# Patient Record
Sex: MALE | Race: OTHER | Employment: FULL TIME | URBAN - METROPOLITAN AREA
[De-identification: names, ages, dates, MRNs, and addresses within clinical notes are randomized per-mention and may not be internally consistent; named-entity substitution may affect disease eponyms.]

---

## 2018-07-03 ENCOUNTER — HOSPITAL ENCOUNTER (OUTPATIENT)
Dept: GENERAL RADIOLOGY | Facility: HOSPITAL | Age: 48
Discharge: HOME | End: 2018-07-03
Attending: INTERNAL MEDICINE
Payer: COMMERCIAL

## 2018-07-03 DIAGNOSIS — M25.472 LEFT ANKLE SWELLING: ICD-10-CM

## 2018-07-03 DIAGNOSIS — M25.572 LEFT ANKLE PAIN, UNSPECIFIED CHRONICITY: ICD-10-CM

## 2018-07-03 PROCEDURE — 73610 X-RAY EXAM OF ANKLE: CPT | Performed by: INTERNAL MEDICINE

## 2018-12-07 ENCOUNTER — HOSPITAL ENCOUNTER (OUTPATIENT)
Dept: GENERAL RADIOLOGY | Facility: HOSPITAL | Age: 48
Discharge: HOME | End: 2018-12-07
Attending: INTERNAL MEDICINE
Payer: COMMERCIAL

## 2018-12-07 DIAGNOSIS — G89.29 CHRONIC MIDLINE LOW BACK PAIN WITHOUT SCIATICA: ICD-10-CM

## 2018-12-07 DIAGNOSIS — M54.50 CHRONIC MIDLINE LOW BACK PAIN WITHOUT SCIATICA: ICD-10-CM

## 2018-12-07 PROCEDURE — 72110 X-RAY EXAM L-2 SPINE 4/>VWS: CPT | Performed by: INTERNAL MEDICINE

## 2019-05-16 PROBLEM — B19.10 HEPATITIS B: Status: ACTIVE | Noted: 2019-05-16

## 2019-11-06 ENCOUNTER — TELEPHONE (OUTPATIENT)
Dept: ORTHOPEDICS | Age: 49
End: 2019-11-06

## 2019-11-07 ENCOUNTER — IMAGING SERVICES (OUTPATIENT)
Dept: GENERAL RADIOLOGY | Age: 49
End: 2019-11-07

## 2019-11-11 ENCOUNTER — APPOINTMENT (OUTPATIENT)
Dept: SPORTS MEDICINE | Age: 49
End: 2019-11-11

## 2019-11-11 ENCOUNTER — WORKER'S COMP (OUTPATIENT)
Dept: SPORTS MEDICINE | Age: 49
End: 2019-11-11

## 2019-11-11 DIAGNOSIS — S62.604A CLOSED NONDISPLACED FRACTURE OF PHALANX OF RIGHT RING FINGER, UNSPECIFIED PHALANX, INITIAL ENCOUNTER: Primary | ICD-10-CM

## 2019-11-11 PROCEDURE — 99203 OFFICE O/P NEW LOW 30 MIN: CPT | Performed by: INTERNAL MEDICINE

## 2019-11-11 RX ORDER — LOVASTATIN 40 MG/1
40 TABLET ORAL
COMMUNITY
Start: 2019-05-16

## 2019-11-11 RX ORDER — AMMONIUM LACTATE 12 G/100G
CREAM TOPICAL
COMMUNITY
Start: 2018-05-11

## 2019-11-11 SDOH — HEALTH STABILITY: MENTAL HEALTH: HOW OFTEN DO YOU HAVE 6 OR MORE DRINKS ON ONE OCCASION?: LESS THAN MONTHLY

## 2019-11-11 SDOH — HEALTH STABILITY: MENTAL HEALTH: HOW OFTEN DO YOU HAVE A DRINK CONTAINING ALCOHOL?: MONTHLY OR LESS

## 2019-11-11 ASSESSMENT — PAIN SCALES - GENERAL: PAINLEVEL: 0

## 2019-11-18 ENCOUNTER — IMAGING SERVICES (OUTPATIENT)
Dept: GENERAL RADIOLOGY | Age: 49
End: 2019-11-18
Attending: INTERNAL MEDICINE

## 2019-11-18 ENCOUNTER — WORKER'S COMP (OUTPATIENT)
Dept: SPORTS MEDICINE | Age: 49
End: 2019-11-18

## 2019-11-18 VITALS
DIASTOLIC BLOOD PRESSURE: 78 MMHG | HEART RATE: 72 BPM | HEIGHT: 69 IN | BODY MASS INDEX: 23.11 KG/M2 | WEIGHT: 156 LBS | SYSTOLIC BLOOD PRESSURE: 119 MMHG

## 2019-11-18 DIAGNOSIS — S62.604D CLOSED NONDISPLACED FRACTURE OF PHALANX OF RIGHT RING FINGER WITH ROUTINE HEALING, UNSPECIFIED PHALANX, SUBSEQUENT ENCOUNTER: Primary | ICD-10-CM

## 2019-11-18 DIAGNOSIS — S62.604A CLOSED NONDISPLACED FRACTURE OF PHALANX OF RIGHT RING FINGER, UNSPECIFIED PHALANX, INITIAL ENCOUNTER: ICD-10-CM

## 2019-11-18 DIAGNOSIS — M20.011 MALLET FINGER OF RIGHT HAND: ICD-10-CM

## 2019-11-18 PROCEDURE — 73140 X-RAY EXAM OF FINGER(S): CPT | Performed by: RADIOLOGY

## 2019-11-18 PROCEDURE — 99213 OFFICE O/P EST LOW 20 MIN: CPT | Performed by: INTERNAL MEDICINE

## 2019-11-18 SDOH — HEALTH STABILITY: MENTAL HEALTH: HOW OFTEN DO YOU HAVE 6 OR MORE DRINKS ON ONE OCCASION?: LESS THAN MONTHLY

## 2019-11-18 SDOH — HEALTH STABILITY: MENTAL HEALTH: HOW OFTEN DO YOU HAVE A DRINK CONTAINING ALCOHOL?: MONTHLY OR LESS

## 2019-12-16 ENCOUNTER — TELEPHONE (OUTPATIENT)
Dept: SPORTS MEDICINE | Age: 49
End: 2019-12-16

## 2019-12-16 ENCOUNTER — WORKER'S COMP (OUTPATIENT)
Dept: SPORTS MEDICINE | Age: 49
End: 2019-12-16

## 2019-12-16 ENCOUNTER — IMAGING SERVICES (OUTPATIENT)
Dept: GENERAL RADIOLOGY | Age: 49
End: 2019-12-16
Attending: INTERNAL MEDICINE

## 2019-12-16 VITALS
HEART RATE: 72 BPM | DIASTOLIC BLOOD PRESSURE: 75 MMHG | WEIGHT: 156 LBS | BODY MASS INDEX: 23.11 KG/M2 | HEIGHT: 69 IN | SYSTOLIC BLOOD PRESSURE: 119 MMHG

## 2019-12-16 DIAGNOSIS — S62.604D CLOSED NONDISPLACED FRACTURE OF PHALANX OF RIGHT RING FINGER WITH ROUTINE HEALING, UNSPECIFIED PHALANX, SUBSEQUENT ENCOUNTER: Primary | ICD-10-CM

## 2019-12-16 DIAGNOSIS — S62.604D CLOSED NONDISPLACED FRACTURE OF PHALANX OF RIGHT RING FINGER WITH ROUTINE HEALING, UNSPECIFIED PHALANX, SUBSEQUENT ENCOUNTER: ICD-10-CM

## 2019-12-16 PROCEDURE — 73140 X-RAY EXAM OF FINGER(S): CPT | Performed by: RADIOLOGY

## 2019-12-16 PROCEDURE — 99213 OFFICE O/P EST LOW 20 MIN: CPT | Performed by: INTERNAL MEDICINE

## 2019-12-16 SDOH — HEALTH STABILITY: MENTAL HEALTH: HOW OFTEN DO YOU HAVE 6 OR MORE DRINKS ON ONE OCCASION?: LESS THAN MONTHLY

## 2019-12-16 SDOH — HEALTH STABILITY: MENTAL HEALTH: HOW OFTEN DO YOU HAVE A DRINK CONTAINING ALCOHOL?: MONTHLY OR LESS

## 2020-01-07 ENCOUNTER — IMAGING SERVICES (OUTPATIENT)
Dept: GENERAL RADIOLOGY | Age: 50
End: 2020-01-07
Attending: ORTHOPAEDIC SURGERY

## 2020-01-07 ENCOUNTER — WORKER'S COMP (OUTPATIENT)
Dept: ORTHOPEDICS | Age: 50
End: 2020-01-07

## 2020-01-07 VITALS — BODY MASS INDEX: 23.7 KG/M2 | HEIGHT: 69 IN | WEIGHT: 160 LBS

## 2020-01-07 DIAGNOSIS — S62.604D CLOSED NONDISPLACED FRACTURE OF PHALANX OF RIGHT RING FINGER WITH ROUTINE HEALING, UNSPECIFIED PHALANX, SUBSEQUENT ENCOUNTER: ICD-10-CM

## 2020-01-07 DIAGNOSIS — S62.604D CLOSED NONDISPLACED FRACTURE OF PHALANX OF RIGHT RING FINGER WITH ROUTINE HEALING, UNSPECIFIED PHALANX, SUBSEQUENT ENCOUNTER: Primary | ICD-10-CM

## 2020-01-07 PROCEDURE — 99244 OFF/OP CNSLTJ NEW/EST MOD 40: CPT | Performed by: ORTHOPAEDIC SURGERY

## 2020-01-07 PROCEDURE — 73140 X-RAY EXAM OF FINGER(S): CPT | Performed by: RADIOLOGY

## 2020-02-10 ENCOUNTER — WORKER'S COMP (OUTPATIENT)
Dept: SPORTS MEDICINE | Age: 50
End: 2020-02-10

## 2020-02-10 VITALS
BODY MASS INDEX: 23.7 KG/M2 | DIASTOLIC BLOOD PRESSURE: 74 MMHG | WEIGHT: 160 LBS | SYSTOLIC BLOOD PRESSURE: 110 MMHG | HEART RATE: 68 BPM | HEIGHT: 69 IN

## 2020-02-10 DIAGNOSIS — M20.011 MALLET FINGER OF RIGHT HAND: ICD-10-CM

## 2020-02-10 DIAGNOSIS — S62.604D CLOSED NONDISPLACED FRACTURE OF PHALANX OF RIGHT RING FINGER WITH ROUTINE HEALING, UNSPECIFIED PHALANX, SUBSEQUENT ENCOUNTER: Primary | ICD-10-CM

## 2020-02-10 PROCEDURE — 99213 OFFICE O/P EST LOW 20 MIN: CPT | Performed by: INTERNAL MEDICINE

## 2020-02-10 SDOH — HEALTH STABILITY: MENTAL HEALTH: HOW OFTEN DO YOU HAVE 6 OR MORE DRINKS ON ONE OCCASION?: LESS THAN MONTHLY

## 2020-02-10 SDOH — HEALTH STABILITY: MENTAL HEALTH: HOW OFTEN DO YOU HAVE A DRINK CONTAINING ALCOHOL?: MONTHLY OR LESS

## 2020-02-11 ENCOUNTER — TELEPHONE (OUTPATIENT)
Dept: ORTHOPEDICS | Age: 50
End: 2020-02-11

## 2021-05-26 VITALS
HEIGHT: 69 IN | HEART RATE: 80 BPM | RESPIRATION RATE: 20 BRPM | SYSTOLIC BLOOD PRESSURE: 128 MMHG | WEIGHT: 156.6 LBS | DIASTOLIC BLOOD PRESSURE: 74 MMHG | BODY MASS INDEX: 23.19 KG/M2

## 2021-07-23 ENCOUNTER — HOSPITAL ENCOUNTER (OUTPATIENT)
Dept: GENERAL RADIOLOGY | Facility: HOSPITAL | Age: 51
Discharge: HOME | End: 2021-07-23
Attending: INTERNAL MEDICINE
Payer: COMMERCIAL

## 2021-07-23 DIAGNOSIS — S89.92XA INJURY OF LEFT LOWER EXTREMITY, INITIAL ENCOUNTER: ICD-10-CM

## 2021-07-23 DIAGNOSIS — M25.472 LEFT ANKLE SWELLING: ICD-10-CM

## 2021-07-23 PROCEDURE — 73610 X-RAY EXAM OF ANKLE: CPT | Performed by: INTERNAL MEDICINE

## 2021-07-23 PROCEDURE — 73590 X-RAY EXAM OF LOWER LEG: CPT | Performed by: INTERNAL MEDICINE

## 2023-08-15 PROBLEM — M54.50 ACUTE MIDLINE LOW BACK PAIN WITHOUT SCIATICA: Status: ACTIVE | Noted: 2023-08-15

## 2025-05-10 ENCOUNTER — HOSPITAL ENCOUNTER (OUTPATIENT)
Dept: GENERAL RADIOLOGY | Facility: HOSPITAL | Age: 55
Discharge: HOME | End: 2025-05-10
Attending: INTERNAL MEDICINE
Payer: COMMERCIAL

## 2025-05-10 DIAGNOSIS — R22.0 MASS OF SCALP: ICD-10-CM

## 2025-05-10 PROCEDURE — 70260 X-RAY EXAM OF SKULL: CPT | Performed by: INTERNAL MEDICINE

## 2025-05-27 ENCOUNTER — OFFICE VISIT (OUTPATIENT)
Facility: LOCATION | Age: 55
End: 2025-05-27
Payer: COMMERCIAL

## 2025-05-27 VITALS
TEMPERATURE: 99 F | OXYGEN SATURATION: 96 % | HEART RATE: 78 BPM | SYSTOLIC BLOOD PRESSURE: 124 MMHG | DIASTOLIC BLOOD PRESSURE: 68 MMHG

## 2025-05-27 DIAGNOSIS — L72.9 SCALP CYST: Primary | ICD-10-CM

## 2025-05-27 PROCEDURE — 99202 OFFICE O/P NEW SF 15 MIN: CPT | Performed by: STUDENT IN AN ORGANIZED HEALTH CARE EDUCATION/TRAINING PROGRAM

## 2025-05-27 PROCEDURE — 3074F SYST BP LT 130 MM HG: CPT | Performed by: STUDENT IN AN ORGANIZED HEALTH CARE EDUCATION/TRAINING PROGRAM

## 2025-05-27 PROCEDURE — 3078F DIAST BP <80 MM HG: CPT | Performed by: STUDENT IN AN ORGANIZED HEALTH CARE EDUCATION/TRAINING PROGRAM

## 2025-05-27 NOTE — H&P
New Patient Visit Note       Active Problems      1. Scalp cyst        Chief Complaint   Chief Complaint   Patient presents with    New Patient     NP-mass of scalp- patient states he has 2 lumps. Never drained.        History of Present Illness   History of Present Illness  Kisha Beltran is a 54 year old male who presents with a long-standing scalp mass.    He has had a scalp mass for over twenty years, which has slowly increased in size. Additionally, he has another similar mass on the back of his head that has also grown over time. The masses do not drain and only cause pain when he accidentally hits his head against something.    He is concerned about the visibility of the mass, particularly when the wind blows his hair, making it more noticeable. This has been a source of discomfort and pain.             Allergies  Kisha is allergic to aspirin buffered.    Past Medical / Surgical / Social / Family History    The past medical and past surgical history have been reviewed by me today.    Past Medical History[1]  Past Surgical History[2]    The family history and social history have been reviewed by me today.    Family History[3]  Social Hx on file[4]   Medications - Current[5]      Review of Systems  The Review of Systems has been reviewed by me during today.  Review of Systems    Physical Findings   /68 (BP Location: Left arm, Patient Position: Sitting, Cuff Size: adult)   Pulse 78   Temp 99 °F (37.2 °C) (Temporal)   SpO2 96%   Physical Exam  Constitutional:       Appearance: Normal appearance.   HENT:      Head: Normocephalic and atraumatic.   Cardiovascular:      Pulses: Normal pulses.   Pulmonary:      Effort: Pulmonary effort is normal.   Skin:     General: Skin is warm.      Capillary Refill: Capillary refill takes less than 2 seconds.             Comments: There is a 3 cm well circumscribed cyst in the anterior rightfrontal scalp. This is mobilenon adherent to deep or superficial structures    and  fluctuant, no signs of infection or erythema, there is a similar lesion measuring 2 cms in the left posterior parietal scalp, also well circumscribed and non adherent to deep or superficial structures    Neurological:      Mental Status: He is alert and oriented to person, place, and time. Mental status is at baseline.             Assessment/Plan  1. Scalp cyst        Kisha Beltran is a 54 year old male referred by Leland Soto MD for evaluation of scalp cysts. These cause pain and discomfort. I recommend proceeding with excision under anesthesia. The risks of lesion excision were discussed with the patient and include but are not limited to bleeding, hematoma, infection, incision separation, incomplete excision, recurrence, injury to surrounding structures, need for additional surgery. The patient voiced understanding and agreed to proceed.  .     Paty Beltre MD       [1]   Past Medical History:   HYPERLIPIDEMIA    Hyperlipidemia   [2] History reviewed. No pertinent surgical history.  [3] History reviewed. No pertinent family history.  [4]   Social History  Socioeconomic History    Marital status:    Tobacco Use    Smoking status: Every Day     Current packs/day: 0.25     Average packs/day: 0.3 packs/day for 25.0 years (6.3 ttl pk-yrs)     Types: Cigarettes    Smokeless tobacco: Never   Vaping Use    Vaping status: Never Used   Substance and Sexual Activity    Alcohol use: No    Drug use: No    Sexual activity: Never   [5]   Current Outpatient Medications:     ergocalciferol 1.25 MG (44018 UT) Oral Cap, Take 1 capsule (50,000 Units total) by mouth once a week., Disp: 12 capsule, Rfl: 3    Lovastatin 40 MG Oral Tab, Take 1 tablet (40 mg total) by mouth nightly., Disp: 90 tablet, Rfl: 2

## 2025-05-27 NOTE — PROGRESS NOTES
The following individual(s) verbally consented to be recorded using ambient AI listening technology and understand that they can each withdraw their consent to this listening technology at any point by asking the clinician to turn off or pause the recording:    Patient name: Kisha Beltran  Additional names:  Amber, spouse

## 2025-06-04 ENCOUNTER — TELEPHONE (OUTPATIENT)
Facility: LOCATION | Age: 55
End: 2025-06-04

## 2025-06-04 NOTE — TELEPHONE ENCOUNTER
Spoke with patients wife- mother did not answer. Informed that the patient may be able to return to work after just a day or 2. Overall it will be up to the doctor to determine after the procedure is done. Patients wife verbalized understanding, is agreeable, and had no further questions or concerns.

## 2025-06-04 NOTE — TELEPHONE ENCOUNTER
Patient mother called and would like to know how long will the patient need to be out of work his upcoming surgery on 6/27/25 with DR. Carter      [phone] call mother

## 2025-06-12 ENCOUNTER — TELEPHONE (OUTPATIENT)
Facility: LOCATION | Age: 55
End: 2025-06-12

## 2025-06-12 DIAGNOSIS — L72.9 SCALP CYST: Primary | ICD-10-CM

## 2025-06-12 NOTE — TELEPHONE ENCOUNTER
No prior authorization required for cpt code 85951 through [facility]. Spoke with Allison isabel @ 11:54am on 6/12   Patient's wound dressing completed per provider order. Wound cleansed with antiseptic spray. Bacitracin applied topically, covered with telfa island adhesive dressing.

## 2025-06-27 ENCOUNTER — HOSPITAL ENCOUNTER (OUTPATIENT)
Facility: HOSPITAL | Age: 55
Setting detail: HOSPITAL OUTPATIENT SURGERY
Discharge: HOME | End: 2025-06-27
Attending: STUDENT IN AN ORGANIZED HEALTH CARE EDUCATION/TRAINING PROGRAM | Admitting: STUDENT IN AN ORGANIZED HEALTH CARE EDUCATION/TRAINING PROGRAM
Payer: COMMERCIAL

## 2025-06-27 ENCOUNTER — ANESTHESIA (OUTPATIENT)
Dept: SURGERY | Facility: HOSPITAL | Age: 55
End: 2025-06-27
Payer: COMMERCIAL

## 2025-06-27 ENCOUNTER — ANESTHESIA EVENT (OUTPATIENT)
Dept: SURGERY | Facility: HOSPITAL | Age: 55
End: 2025-06-27
Payer: COMMERCIAL

## 2025-06-27 VITALS
HEIGHT: 69 IN | DIASTOLIC BLOOD PRESSURE: 70 MMHG | SYSTOLIC BLOOD PRESSURE: 121 MMHG | HEART RATE: 61 BPM | WEIGHT: 162 LBS | RESPIRATION RATE: 18 BRPM | OXYGEN SATURATION: 100 % | BODY MASS INDEX: 23.99 KG/M2 | TEMPERATURE: 99 F

## 2025-06-27 DIAGNOSIS — L72.9 SCALP CYST: ICD-10-CM

## 2025-06-27 DIAGNOSIS — G89.18 POST-OPERATIVE PAIN: Primary | ICD-10-CM

## 2025-06-27 PROCEDURE — 11422 EXC H-F-NK-SP B9+MARG 1.1-2: CPT | Performed by: STUDENT IN AN ORGANIZED HEALTH CARE EDUCATION/TRAINING PROGRAM

## 2025-06-27 PROCEDURE — 11423 EXC H-F-NK-SP B9+MARG 2.1-3: CPT | Performed by: STUDENT IN AN ORGANIZED HEALTH CARE EDUCATION/TRAINING PROGRAM

## 2025-06-27 PROCEDURE — 12032 INTMD RPR S/A/T/EXT 2.6-7.5: CPT | Performed by: STUDENT IN AN ORGANIZED HEALTH CARE EDUCATION/TRAINING PROGRAM

## 2025-06-27 RX ORDER — ONDANSETRON 2 MG/ML
INJECTION INTRAMUSCULAR; INTRAVENOUS AS NEEDED
Status: DISCONTINUED | OUTPATIENT
Start: 2025-06-27 | End: 2025-06-27 | Stop reason: SURG

## 2025-06-27 RX ORDER — HYDROCODONE BITARTRATE AND ACETAMINOPHEN 5; 325 MG/1; MG/1
2 TABLET ORAL ONCE AS NEEDED
Status: COMPLETED | OUTPATIENT
Start: 2025-06-27 | End: 2025-06-27

## 2025-06-27 RX ORDER — ONDANSETRON 2 MG/ML
4 INJECTION INTRAMUSCULAR; INTRAVENOUS EVERY 6 HOURS PRN
Status: DISCONTINUED | OUTPATIENT
Start: 2025-06-27 | End: 2025-06-27

## 2025-06-27 RX ORDER — HYDROMORPHONE HYDROCHLORIDE 1 MG/ML
0.2 INJECTION, SOLUTION INTRAMUSCULAR; INTRAVENOUS; SUBCUTANEOUS EVERY 5 MIN PRN
Refills: 0 | Status: DISCONTINUED | OUTPATIENT
Start: 2025-06-27 | End: 2025-06-27

## 2025-06-27 RX ORDER — ACETAMINOPHEN 500 MG
1000 TABLET ORAL ONCE AS NEEDED
Status: COMPLETED | OUTPATIENT
Start: 2025-06-27 | End: 2025-06-27

## 2025-06-27 RX ORDER — HYDROMORPHONE HYDROCHLORIDE 1 MG/ML
0.2 INJECTION, SOLUTION INTRAMUSCULAR; INTRAVENOUS; SUBCUTANEOUS EVERY 5 MIN PRN
Status: DISCONTINUED | OUTPATIENT
Start: 2025-06-27 | End: 2025-06-27

## 2025-06-27 RX ORDER — HYDROMORPHONE HYDROCHLORIDE 1 MG/ML
0.4 INJECTION, SOLUTION INTRAMUSCULAR; INTRAVENOUS; SUBCUTANEOUS EVERY 5 MIN PRN
Refills: 0 | Status: DISCONTINUED | OUTPATIENT
Start: 2025-06-27 | End: 2025-06-27

## 2025-06-27 RX ORDER — HYDROCODONE BITARTRATE AND ACETAMINOPHEN 5; 325 MG/1; MG/1
2 TABLET ORAL ONCE AS NEEDED
Refills: 0 | Status: DISCONTINUED | OUTPATIENT
Start: 2025-06-27 | End: 2025-06-27

## 2025-06-27 RX ORDER — HYDROCODONE BITARTRATE AND ACETAMINOPHEN 5; 325 MG/1; MG/1
1 TABLET ORAL ONCE AS NEEDED
Refills: 0 | Status: DISCONTINUED | OUTPATIENT
Start: 2025-06-27 | End: 2025-06-27

## 2025-06-27 RX ORDER — HYDROCODONE BITARTRATE AND ACETAMINOPHEN 5; 325 MG/1; MG/1
1 TABLET ORAL ONCE AS NEEDED
Status: COMPLETED | OUTPATIENT
Start: 2025-06-27 | End: 2025-06-27

## 2025-06-27 RX ORDER — HYDROMORPHONE HYDROCHLORIDE 1 MG/ML
0.6 INJECTION, SOLUTION INTRAMUSCULAR; INTRAVENOUS; SUBCUTANEOUS EVERY 5 MIN PRN
Status: DISCONTINUED | OUTPATIENT
Start: 2025-06-27 | End: 2025-06-27

## 2025-06-27 RX ORDER — ACETAMINOPHEN 500 MG
1000 TABLET ORAL ONCE
Status: DISCONTINUED | OUTPATIENT
Start: 2025-06-27 | End: 2025-06-27 | Stop reason: HOSPADM

## 2025-06-27 RX ORDER — OXYCODONE HYDROCHLORIDE 5 MG/1
5 TABLET ORAL EVERY 6 HOURS PRN
Qty: 10 TABLET | Refills: 0 | Status: SHIPPED | OUTPATIENT
Start: 2025-06-27 | End: 2025-07-09

## 2025-06-27 RX ORDER — SCOPOLAMINE 1 MG/3D
1 PATCH, EXTENDED RELEASE TRANSDERMAL ONCE
Status: DISCONTINUED | OUTPATIENT
Start: 2025-06-27 | End: 2025-06-27 | Stop reason: HOSPADM

## 2025-06-27 RX ORDER — LIDOCAINE HYDROCHLORIDE AND EPINEPHRINE 10; 10 MG/ML; UG/ML
INJECTION, SOLUTION INFILTRATION; PERINEURAL AS NEEDED
Status: DISCONTINUED | OUTPATIENT
Start: 2025-06-27 | End: 2025-06-27 | Stop reason: HOSPADM

## 2025-06-27 RX ORDER — SODIUM CHLORIDE, SODIUM LACTATE, POTASSIUM CHLORIDE, CALCIUM CHLORIDE 600; 310; 30; 20 MG/100ML; MG/100ML; MG/100ML; MG/100ML
INJECTION, SOLUTION INTRAVENOUS CONTINUOUS
Status: DISCONTINUED | OUTPATIENT
Start: 2025-06-27 | End: 2025-06-27

## 2025-06-27 RX ORDER — MIDAZOLAM HYDROCHLORIDE 1 MG/ML
1 INJECTION INTRAMUSCULAR; INTRAVENOUS EVERY 5 MIN PRN
Status: DISCONTINUED | OUTPATIENT
Start: 2025-06-27 | End: 2025-06-27

## 2025-06-27 RX ORDER — DEXAMETHASONE SODIUM PHOSPHATE 4 MG/ML
VIAL (ML) INJECTION AS NEEDED
Status: DISCONTINUED | OUTPATIENT
Start: 2025-06-27 | End: 2025-06-27 | Stop reason: SURG

## 2025-06-27 RX ORDER — NALOXONE HYDROCHLORIDE 0.4 MG/ML
80 INJECTION, SOLUTION INTRAMUSCULAR; INTRAVENOUS; SUBCUTANEOUS AS NEEDED
Status: DISCONTINUED | OUTPATIENT
Start: 2025-06-27 | End: 2025-06-27

## 2025-06-27 RX ORDER — HYDROMORPHONE HYDROCHLORIDE 1 MG/ML
0.6 INJECTION, SOLUTION INTRAMUSCULAR; INTRAVENOUS; SUBCUTANEOUS EVERY 5 MIN PRN
Refills: 0 | Status: DISCONTINUED | OUTPATIENT
Start: 2025-06-27 | End: 2025-06-27

## 2025-06-27 RX ORDER — ACETAMINOPHEN 500 MG
1000 TABLET ORAL ONCE AS NEEDED
Status: DISCONTINUED | OUTPATIENT
Start: 2025-06-27 | End: 2025-06-27

## 2025-06-27 RX ORDER — LIDOCAINE HYDROCHLORIDE 10 MG/ML
INJECTION, SOLUTION EPIDURAL; INFILTRATION; INTRACAUDAL; PERINEURAL AS NEEDED
Status: DISCONTINUED | OUTPATIENT
Start: 2025-06-27 | End: 2025-06-27 | Stop reason: SURG

## 2025-06-27 RX ORDER — HYDROMORPHONE HYDROCHLORIDE 1 MG/ML
0.4 INJECTION, SOLUTION INTRAMUSCULAR; INTRAVENOUS; SUBCUTANEOUS EVERY 5 MIN PRN
Status: DISCONTINUED | OUTPATIENT
Start: 2025-06-27 | End: 2025-06-27

## 2025-06-27 RX ORDER — PROCHLORPERAZINE EDISYLATE 5 MG/ML
5 INJECTION INTRAMUSCULAR; INTRAVENOUS EVERY 8 HOURS PRN
Status: DISCONTINUED | OUTPATIENT
Start: 2025-06-27 | End: 2025-06-27

## 2025-06-27 RX ADMIN — SODIUM CHLORIDE, SODIUM LACTATE, POTASSIUM CHLORIDE, CALCIUM CHLORIDE: 600; 310; 30; 20 INJECTION, SOLUTION INTRAVENOUS at 16:03:00

## 2025-06-27 RX ADMIN — ONDANSETRON 4 MG: 2 INJECTION INTRAMUSCULAR; INTRAVENOUS at 15:08:00

## 2025-06-27 RX ADMIN — LIDOCAINE HYDROCHLORIDE 100 MG: 10 INJECTION, SOLUTION EPIDURAL; INFILTRATION; INTRACAUDAL; PERINEURAL at 15:01:00

## 2025-06-27 RX ADMIN — DEXAMETHASONE SODIUM PHOSPHATE 4 MG: 4 MG/ML VIAL (ML) INJECTION at 15:08:00

## 2025-06-27 NOTE — OPERATIVE REPORT
[facility]  Operative Note    Kisha Beltran Location: OR   CSN [id number] MRN [id number]    1970 Age 54 year old   Admission Date 2025 Operation Date 2025   Attending Physician Paty Beltre MD Operating Physician Paty Beltre MD   PCP Leland Soto MD          Patient Name: Kisha Beltran    Preoperative Diagnosis: Scalp cyst [L72.9]    Postoperative Diagnosis: Same as pre-op diagnosis.    Primary Surgeon: Paty Beltre MD    Assistant: Monica Baez PA-C    Anesthesia: MAC    Anesthesiologist: Anesthesiologist.: Myerson, David, MD    Procedures: SCALP CYST EXCISION TIMES TWO     Implants: * No implants in log *     Specimen: Frontal scalp cyst, left temporal scalp cyst    Drains: none    Estimated Blood Loss: Blood Output: 5 mL (2025  3:47 PM)       Complications: none    Condition: stable     Indications for Surgery:   Kisha Beltran is a 54 year old male who presented with two painful cysts on his scalp. He is here for elective excision of the cysts. The risks of lesion excision were discussed with the patient and include but are not limited to bleeding, hematoma, infection, incision separation, incomplete excision, recurrence, injury to surrounding structures, need for additional surgery. The patient voiced understanding and agreed to proceed.       Surgical Findings:   3 cm frontal scalp cyst  2 cm left temporal scalp cyst    Description of Procedure:   The patient was transported to the operating room and placed on the operating table in supine position.MAC was administered. Preoperative antibiotics were given. The scalp was prepped and draped in sterile fashion. A time-out was performed.    The skin was infiltrated atop the frontal lesion. An elliptical incision was made and the cyst dissected from all surrounding tissue. The cyst was removed and measured 3 cms. The incision was irrigated and hemostasis achieved. The wound measured 4 cms. This was closed using a running  subcuticular 4-0 Monocryl and was re-enforced with interrupted 3-0 nylon sutures in an interrupted fashion.   The skin was infiltrated atop the left temporal lesion. An elliptical incision was made and the cyst dissected from all surrounding tissue. The cyst was removed and measured 2 cms. The incision was irrigated and hemostasis achieved. The wound measured 3 cms. This was closed using skin staples.  The patient was awakened from anesthesia and brought to the recovery room in good condition. The patient tolerated the procedure well without apparent complication. All sponge, needle, and instrument counts were correct at the end of the case.    Paty Beltre MD  6/27/2025  3:49 PM

## 2025-06-27 NOTE — DISCHARGE INSTRUCTIONS
Home Care Instructions  Cyst Excision  Paty Beltre MD         WHAT TO EXPECT  You may experience pain at the incisions at rest and with movement. You may have some mild bruising around the incision. If taking narcotic medications, you may experience constipation. If you have not had a bowel movement by the third day after surgery, take Miralax 15g (one cap full) every 4 hours until you have a bowel movement. If another 24 hours goes by without a bowel movement, then you can take a dose of magnesium citrate or milk of magnesia. You may experience sore throat. This is due to the breathing tube used during surgery. You may experience mild nausea and vomiting for the first 24 hours, this should resolve quickly.    MEDICATIONS  You can take Tylenol 1000mg (2 Extra Strength Tylenol) every 8 hours for pain. For the first 48 hours it is best to take the Tylenol every 8 hours even if you do not feel much pain. For moderate to severe post-operative pain control you will be prescribed Tramadol or Oxycodone. Take one pill (50mg) every six hours as needed for pain. If you do not feel that narcotics are necessary you shouldn’t take them. If the pain is severe the patient may take two pills every six hours, taking care not to exceed 8 pills in a 24 hour period. After 48 hours, you can also take Advil (ibuprofeh) 800mg every 8 hours or Alleve (naproxen) 500mg every 12 hours. Please ask your surgeon before resuming blood thinners such as aspirin, plavix or coumadin. All other home medications may be resumed as scheduled.     DIET  There are no diet restrictions. You may resume a general diet immediately, however it is best to start light and bland. This is not a good time to eat excessively.  Minimize high fat foods and dairy products in the immediate post-operative time period as this may cause some intestinal discomfort or loose stools. There should be no alcohol consumption in the immediate recover time period or within six  hours of taking narcotics.    WOUND CARE  Your staples and sutures will remain in place for 10-14 days. There is no need to cover the incisions. Soap can get on the incisions. Do not scrub the incisions. No hair dye or chemicals of any kind should get on the incisions. Do not apply any neosporin, hydrogen peroxide, or other topical medications or ointments. Do not submerge the incisions under water (bathing, swimming) for two weeks. There is absorbable sutures under the skin that will dissolve over time.     ACTIVITY  Every day you should be up out of bed, showered and dressed. Do not stay in bed all day. You should be up and walking around the house frequently. It is normal to feel tired or fatigued for the first week after surgery. Walking helps to avoid pneumonia and blood clots in the legs. You can also go to the store or walk outside to get out of the house and to stay active. Do not drive within 8 hours of taking a narcotic medication (tramadol or Oxycodone). You must be able to react quickly to avoid a traffic accident without pain before you can drive. You may return to work once you no longer need narcotic pain medications during the day. Avoid strenuous upper body activity for at least a week.    APPOINTMENT  Please call our office as soon as possible for an appointment at [phone]. Please call our office immediately for fever greater than 100.5, extensive bleeding, inability to urinate.  For life threatening emergencies such as severe chest pain, shortness of breath, loss of conciousness call 911. For non-emergent care please call our office after 8:30 a.m. Monday through Friday. The number above directs to the answering service after hours to reach the on-call physician.

## 2025-06-27 NOTE — H&P
New Patient Visit Note       Active Problems      No diagnosis found.      Chief Complaint   No chief complaint on file.      History of Present Illness   History of Present Illness  Kisha Beltran is a 54 year old male who presents with a long-standing scalp mass.    He has had a scalp mass for over twenty years, which has slowly increased in size. Additionally, he has another similar mass on the back of his head that has also grown over time. The masses do not drain and only cause pain when he accidentally hits his head against something.    He is concerned about the visibility of the mass, particularly when the wind blows his hair, making it more noticeable. This has been a source of discomfort and pain.             Allergies  Kisha is allergic to aspirin buffered.    Past Medical / Surgical / Social / Family History    The past medical and past surgical history have been reviewed by me today.    Past Medical History[1]  Past Surgical History[2]    The family history and social history have been reviewed by me today.    Family History[3]  Social Hx on file[4]   Medications - Current[5]      Review of Systems  The Review of Systems has been reviewed by me during today.  Review of Systems    Physical Findings     Physical Exam  Constitutional:       Appearance: Normal appearance.   HENT:      Head: Normocephalic and atraumatic.   Cardiovascular:      Pulses: Normal pulses.   Pulmonary:      Effort: Pulmonary effort is normal.   Skin:     General: Skin is warm.      Capillary Refill: Capillary refill takes less than 2 seconds.             Comments: There is a 3 cm well circumscribed cyst in the anterior rightfrontal scalp. This is mobilenon adherent to deep or superficial structures    and fluctuant, no signs of infection or erythema, there is a similar lesion measuring 2 cms in the left posterior parietal scalp, also well circumscribed and non adherent to deep or superficial structures    Neurological:      Mental  Status: He is alert and oriented to person, place, and time. Mental status is at baseline.             Assessment/Plan  No diagnosis found.      Kisha Beltran is a 54 year old male referred by Leland Soto MD for evaluation of scalp cysts. These cause pain and discomfort. I recommend proceeding with excision under anesthesia. The risks of lesion excision were discussed with the patient and include but are not limited to bleeding, hematoma, infection, incision separation, incomplete excision, recurrence, injury to surrounding structures, need for additional surgery. The patient voiced understanding and agreed to proceed.  .     Paty Beltre MD         [1]   Past Medical History:   High cholesterol    History of stomach ulcers    HYPERLIPIDEMIA    Hyperlipidemia    Visual impairment    glasses   [2] History reviewed. No pertinent surgical history.  [3] History reviewed. No pertinent family history.  [4]   Social History  Socioeconomic History    Marital status:    Tobacco Use    Smoking status: Every Day     Current packs/day: 0.25     Average packs/day: 0.3 packs/day for 25.0 years (6.3 ttl pk-yrs)     Types: Cigarettes    Smokeless tobacco: Never   Vaping Use    Vaping status: Never Used   Substance and Sexual Activity    Alcohol use: Yes     Comment: occasionally    Drug use: No    Sexual activity: Never   [5] No current outpatient medications on file.

## 2025-06-27 NOTE — ANESTHESIA POSTPROCEDURE EVALUATION
[facility]    Oltjon Sema Patient Status:  Hospital Outpatient Surgery   Age/Gender 54 year old male MRN [id number]   Location [facility] SURGERY Attending Paty Beltre MD   Hosp Day # 0 PCP Leland Soto MD       Anesthesia Post-op Note    SCALP CYST EXCISION TIMES TWO    Procedure Summary       Date: 06/27/25 Room / Location:  MAIN OR 08 / [facility] MAIN OR    Anesthesia Start: 1458 Anesthesia Stop: 1603    Procedure: SCALP CYST EXCISION TIMES TWO (Head) Diagnosis:       Scalp cyst      (Scalp cyst [L72.9])    Surgeons: Paty Beltre MD Anesthesiologist: Myerson, David, MD    Anesthesia Type: general ASA Status: 2            Anesthesia Type: general    Vitals Value Taken Time   /81 06/27/25 16:03   Temp 98.4 °F (36.9 °C) 06/27/25 16:03   Pulse 65 06/27/25 16:03   Resp 16 06/27/25 16:03   SpO2 98 % 06/27/25 16:03           Patient Location: PACU    Anesthesia Type: general    Airway Patency: patent and extubated    Postop Pain Control: adequate    Mental Status: preanesthetic baseline    Nausea/Vomiting: none    Cardiopulmonary/Hydration status: stable euvolemic    Complications: no apparent anesthesia related complications    Postop vital signs: stable    Dental Exam: Unchanged from Preop    Patient to be discharged from PACU when criteria met.

## 2025-06-27 NOTE — ANESTHESIA PROCEDURE NOTES
Airway  Date/Time: 6/27/2025 3:02 PM  Reason: elective      General Information and Staff   Patient location during procedure: OR  Anesthesiologist: Myerson, David, MD  Performed: anesthesiologist   Performed by: Myerson, David, MD  Authorized by: Myerson, David, MD        Indications and Patient Condition  Indications for airway management: anesthesia  Sedation level: deep      Preoxygenated: yesPatient position: sniffing    Mask difficulty assessment: 1 - vent by mask    Final Airway Details    Final airway type: supraglottic airway      Successful airway: classic  Size: 3     Number of attempts at approach: 1

## 2025-06-27 NOTE — ANESTHESIA PREPROCEDURE EVALUATION
PRE-OP EVALUATION    Patient Name: Kisha Beltran    Admit Diagnosis: Scalp cyst [L72.9]    Pre-op Diagnosis: Scalp cyst [L72.9]    SCALP CYST EXCISION TIMES TWO    Anesthesia Procedure: SCALP CYST EXCISION TIMES TWO    Surgeons and Role:     * Paty Beltre MD - Primary    Pre-op vitals reviewed.        Body mass index is 23.63 kg/m².    Current medications reviewed.  Hospital Medications:  Current Medications[1]    Outpatient Medications:   Prescriptions Prior to Admission[2]    Allergies: Aspirin buffered      Anesthesia Evaluation    Patient summary reviewed.    Anesthetic Complications  (-) history of anesthetic complications         GI/Hepatic/Renal                (+) liver disease  (+) hepatitis               Cardiovascular            MET: >4         (+) hyperlipidemia                                  Endo/Other    Negative endo/other ROS.                              Pulmonary  Comment: smoker                         Neuro/Psych                 (+) neuromuscular disease             Patient Active Problem List:     Smoker     Leg pain     Pure hypercholesterolemia     Hyperlipidemia     Hepatitis B     Acute midline low back pain without sciatica            Past Surgical History[3]  Social Hx on file[4]  History   Drug Use No     Available pre-op labs reviewed.  Lab Results   Component Value Date    WBC 5.9 05/03/2025    RBC 4.67 05/03/2025    HGB 14.0 05/03/2025    HCT 43.9 05/03/2025    MCV 94.0 05/03/2025    MCH 30.0 05/03/2025    MCHC 31.9 (L) 05/03/2025    RDW 13.3 05/03/2025     05/03/2025     Lab Results   Component Value Date     05/03/2025    K 5.4 (H) 05/03/2025     05/03/2025    CO2 29 05/03/2025    BUN 19 05/03/2025    CREATSERUM 0.85 05/03/2025     (H) 05/03/2025    CA 9.5 05/03/2025            Airway      Mallampati: II  Mouth opening: >3 FB  TM distance: > 6 cm  Neck ROM: full Cardiovascular    Cardiovascular exam normal.         Dental    Dentition appears grossly  intact         Pulmonary    Pulmonary exam normal.                 Other findings              ASA: 2   Plan: general  NPO status verified and patient meets guidelines.    Post-procedure pain management plan discussed with surgeon and patient.    Comment: Discussed risks and benefits of GA including sore throat, allergy, nausea, vomiting, dental trauma, pain management modalities, transfusion if needed, etc. Questions answered and options explained. Patient accepts and wishes to proceed. The consent was signed without further questions.    Plan/risks discussed with: patient                Present on Admission:  **None**             [1]    acetaminophen (Tylenol Extra Strength) tab 1,000 mg  1,000 mg Oral Once    scopolamine (Transderm-Scop) 1 MG/3DAYS patch 1 patch  1 patch Transdermal Once    lactated ringers infusion   Intravenous Continuous    ceFAZolin (Ancef) 2g in 10mL IV syringe premix  2 g Intravenous Once   [2]   Medications Prior to Admission   Medication Sig Dispense Refill Last Dose/Taking    ciprofloxacin-dexamethasone 0.3-0.1 % Otic Suspension Place 4 drops into the left ear 2 (two) times daily. 7.5 mL 0 Taking    ergocalciferol 1.25 MG (76888 UT) Oral Cap Take 1 capsule (50,000 Units total) by mouth once a week. 12 capsule 3 Taking    Lovastatin 40 MG Oral Tab Take 1 tablet (40 mg total) by mouth nightly. 90 tablet 2 Taking   [3] History reviewed. No pertinent surgical history.  [4]   Social History  Socioeconomic History    Marital status:    Tobacco Use    Smoking status: Every Day     Current packs/day: 0.25     Average packs/day: 0.3 packs/day for 25.0 years (6.3 ttl pk-yrs)     Types: Cigarettes    Smokeless tobacco: Never   Vaping Use    Vaping status: Never Used   Substance and Sexual Activity    Alcohol use: Yes     Comment: occasionally    Drug use: No    Sexual activity: Never

## 2025-07-09 ENCOUNTER — OFFICE VISIT (OUTPATIENT)
Facility: LOCATION | Age: 55
End: 2025-07-09
Payer: COMMERCIAL

## 2025-07-09 VITALS
TEMPERATURE: 99 F | OXYGEN SATURATION: 96 % | DIASTOLIC BLOOD PRESSURE: 73 MMHG | SYSTOLIC BLOOD PRESSURE: 127 MMHG | HEART RATE: 75 BPM

## 2025-07-09 DIAGNOSIS — Z98.890 POST-OPERATIVE STATE: Primary | ICD-10-CM

## 2025-07-09 NOTE — PROGRESS NOTES
Post Operative Visit Note       Active Problems  1. Post-operative state         Chief Complaint   Chief Complaint   Patient presents with    Post-Op     PO- 6/27 w/sergei SCALP CYST EXCISION TIMES TWO. Pt denies any pain or bleeding. Pt denies and fevers/chills. Pt excited to get staples removed.          History of Present Illness   The patient presents today for staple removal and postoperative visit following excision of scalp cyst x 2 on 6/27/2025 by Dr. Beltre.    The patient states that since his surgery that he is overall doing very well.  He denies complaints today.  He states that his incisions are healing well. He denies any separation or opening of either incision. He denies any bleeding or drainage. He denies experiencing any pain. He states he is tolerating a general diet, denies nausea or vomiting.  He denies diarrhea or constipation.  He denies fever or chills.     Patient has returned to work and would like to know if he has any lifting restrictions as stated in his paperwork. He has no other questions or concerns today.      Allergies  Kisha is allergic to aspirin buffered.    Past Medical / Surgical / Social / Family History    The past medical and past surgical history have been reviewed by me today.     Past Medical History:    High cholesterol    History of stomach ulcers    HYPERLIPIDEMIA    Hyperlipidemia    Visual impairment    glasses     History reviewed. No pertinent surgical history.    The family history and social history have been reviewed by me today.    No family history on file.  Social History     Socioeconomic History    Marital status:    Tobacco Use    Smoking status: Every Day     Current packs/day: 0.25     Average packs/day: 0.3 packs/day for 25.0 years (6.3 ttl pk-yrs)     Types: Cigarettes    Smokeless tobacco: Never   Vaping Use    Vaping status: Never Used   Substance and Sexual Activity    Alcohol use: Yes     Comment: occasionally    Drug use: No    Sexual  activity: Never        Current Outpatient Medications:     ergocalciferol 1.25 MG (50404 UT) Oral Cap, Take 1 capsule (50,000 Units total) by mouth once a week., Disp: 12 capsule, Rfl: 3    Lovastatin 40 MG Oral Tab, Take 1 tablet (40 mg total) by mouth nightly., Disp: 90 tablet, Rfl: 2    oxyCODONE 5 MG Oral Tab, Take 1 tablet (5 mg total) by mouth every 6 (six) hours as needed. (Patient not taking: Reported on 7/9/2025), Disp: 10 tablet, Rfl: 0    ciprofloxacin-dexamethasone 0.3-0.1 % Otic Suspension, Place 4 drops into the left ear 2 (two) times daily. (Patient not taking: Reported on 7/9/2025), Disp: 7.5 mL, Rfl: 0      Review of Systems  A 10 point Review of Systems has been completed by me today and is negative except as above in the HPI.    Physical Findings   /73 (BP Location: Left arm, Patient Position: Sitting, Cuff Size: adult)   Pulse 75   Temp 98.9 °F (37.2 °C) (Temporal)   SpO2 96%   Gen/psych: alert and oriented, cooperative, no apparent distress  Cardiovascular: regular rate  Respiratory: respirations unlabored, no wheeze  Abdominal: soft, non-tender, non-distended, no guarding/rebound  Incisions:   Physical Exam  HENT:      Head:       Staples and non-absorbable sutures were removed in office today, which the patient tolerated well. Incisions remain intact and well-appearing.           Assessment/Plan  1. Post-operative state        Patient is doing well postoperatively. Incision sites are healing well, there are no signs of infection. Sutures and non-absorbable sutures were removed from incisions in office today.  The patient may continue a general diet, with no restrictions.  Continue to keep incision sites clean and dry. Allow soap and water to rinse over incision sites during showers, avoid vigorous scrubbing to the sites. Pat to dry.  Pathology results were discussed with patient.   The patient may return to regular daily activity without restriction. I advised the patient to avoid  bumping or rubbing to his incision sites while they continue to heal.  All of the patient's questions and concerns were addressed during today's visit.  Patient may follow up in clinic as needed.          No orders of the defined types were placed in this encounter.       Imaging & Referrals   None    Follow Up  Return if symptoms worsen or fail to improve.    Mallory Fountain PA-C  [facility]  7/9/2025  3:42 PM

## (undated) DEVICE — GAUZE,SPONGE,4"X4",12PLY,STERILE,LF,2'S: Brand: MEDLINE

## (undated) DEVICE — SLEEVE COMPR MD KNEE LEN SGL USE KENDALL SCD

## (undated) DEVICE — PROXIMATE RH ROTATING HEAD SKIN STAPLERS (35 WIDE) CONTAINS 35 STAINLESS STEEL STAPLES: Brand: PROXIMATE

## (undated) DEVICE — GLOVE SUR 7 SENSICARE PI PIP CRM PWD F

## (undated) DEVICE — SYRINGE MED 10ML LL TIP W/O SFTY DISP

## (undated) DEVICE — EXOFIN PRECISION PEN HIGH VISCOSITY TOPICAL SKIN ADHESIVE: Brand: EXOFIN PRECISION PEN, 1G

## (undated) DEVICE — GLOVE SUR 7.5 SENSICARE PI PIP GRN PWD F

## (undated) DEVICE — SUT ETHLN 3-0 18IN PS-2 NABSRB BLK 19MM 3/8 C

## (undated) DEVICE — YANKAUER,FLEXIBLE HANDLE,FINE CAPACITY: Brand: MEDLINE

## (undated) DEVICE — 3M™ TEGADERM™ TRANSPARENT FILM DRESSING FRAME STYLE, 1624W, 2-3/8 IN X 2-3/4 IN (6 CM X 7 CM), 100/CT 4CT/CASE: Brand: 3M™ TEGADERM™

## (undated) DEVICE — SOLUTION IRRIG 1000ML 0.9% NACL USP BTL

## (undated) DEVICE — SUT MCRYL 4-0 18IN PS-2 ABSRB UD 19MM 3/8 CIR

## (undated) DEVICE — MINI LAP PACK-LF: Brand: MEDLINE INDUSTRIES, INC.